# Patient Record
(demographics unavailable — no encounter records)

---

## 2017-11-17 NOTE — NUR
PT RESTLESS, FIGHTING VENT; RASS=0; ER MD SECHRIST BY BEDSIDE; INCREADSED 
propofol TO 20 MCG/KG/MIN

## 2017-11-17 NOTE — NUR
PT CLEANED AND REPOSITIONED. PT SUCTIONED. VS COMPLETE. FAMILY NOTIFIED FROM ED 
LOBBY, FAMILY NOT IN LOBBY, NO RESPONSE UPON CALL.

## 2017-11-17 NOTE — NUR
-------------------------------------------------------------------------------

            *** Note rosalinda in EDM - 11/17/17 at 1940 by TORIBIO ***             

-------------------------------------------------------------------------------

Physician order given to place 2 POINT SOFT restraints to BILATERAL WRIST to 
prevent PULLING/REMOVAL OF TUBES, WIRES AND CATHETERS.  Resraints placed with 
quick-release ties to bed frame.  Pt under observation.

## 2017-11-17 NOTE — NUR
Physician order given to place 2 POINT SOFT restraints to BILATERAL WRIST to 
prevent PULLING/REMOVAL OF TUBES, WIRES AND CATHETERS.  Resraints placed with 
quick-release ties to bed frame.  Pt under observation.

## 2017-11-17 NOTE — NUR
1706- ATIVAN 2 MG IVP GIVEN

1710-IV SALINE LOCK 20 R WRIST

1716- TONIC CLONIC SZ

1717-10MG VECURONIUM IVP GIVEN

1717-30MG ETOMIDATE IVP GIVEN

1718-10MG VECURONIUM IVP GIVEN

1719- ER MD KLEIN 8.0 CM@24CM AT LIP

## 2017-11-17 NOTE — NUR
-------------------------------------------------------------------------------

            *** Note undone in EDM - 11/17/17 at 2018 by DERECK ***             

-------------------------------------------------------------------------------

2 POINTS SOFT RESTRAINED REMOVED; ER MD SECHRDENZEL GAVE VERBAL ORDER TO REMOVE 
RESTRAINTS; RASS=-3; NAD; VSS; WILL CONTINUE TO MONITOR

## 2017-11-17 NOTE — NUR
PT RESTLESSNESS; VENKAT SMITH SECHRIST BY BEDSIDE; INCREADSED propofol TO 10 
MCG/KG/MIN 

-------------------------------------------------------------------------------

Addendum: 11/17/17 at 1925 by DERECK

          *** Amendment undone in EDM - 11/17/17 at 1927 by DERECK Galdamez**          

-------------------------------------------------------------------------------

PT RESTLESS, FIGHTING VENT; RASS=0; VENKAT KLEIN BY BEDSIDE; INCREADSED 
propofol TO 10 MCG/KG/MIN

## 2017-11-17 NOTE — NUR
PT RESTLESS, FIGHTING VENT; RASS=0; ER MD SECHRIST BY BEDSIDE; INCREADSED 
propofol TO 10 MCG/KG/MIN

## 2017-11-17 NOTE — NUR
RASS=-3; VENKAT SMITH VERBAL GAVE ORDER TO TRANSPORT PT TO VENT; PT STABLE FOR 
TRANSPORT; NAD

-------------------------------------------------------------------------------

Addendum: 11/17/17 at 2013 by DERECK

-------------------------------------------------------------------------------

RASS=-3; VENKAT SMITH VERBAL GAVE ORDER TO TRANSPORT PT TO CT; PT STABLE FOR 
TRANSPORT; NAD

## 2017-11-17 NOTE — NUR
PT IN STATUS EPILETICUS. DR. KLEIN, RT, EMT, RN X2 AND XR AT BEDSIDE FOR 
RSI. INTUBATED 8.0 CUFFED AT 24CM AT THE LIP. MEDS GIVEN AS PER MAR.

## 2017-11-17 NOTE — NUR
PT RESTLESS, FIGHTING VENT; RASS=0; ER MD SECHRIST BY BEDSIDE; INCREADSED 
propofol TO 65 MCG/KG/MIN PER ER MD SECHRIST VERBAL ORDER; SD=462/110 PULSE 
LU=974%  HEART RATE=91 RR=30 RASS=0

## 2017-11-17 NOTE — NUR
2 POINTS SOFT RESTRAINED REMOVED; ER MD SECHRIST GAVE VERBAL ORDER TO REMOVE 
RESTRAINTS; RASS=-3; NAD; VSS; WILL CONTINUE TO MONITOR

## 2017-11-17 NOTE — NUR
Pt suddenly bucking the vent and very strong gag response. Increased propofol 
back up to 30 mcg/kg/min.

## 2017-11-17 NOTE — NUR
PT ARRIVED BY BIBA TO [facility]; PT BIBA WITH C/O SZ; ACCORDING TO PARAMEDIC PT 
HAD 17 SZ PTA; PT ACTIVELY SZ-TONIC CLONIC-LASTING 1-2 MINS LONG; ER MD 
SECHRIST BY BEDSIDE

ACCORDING TO PARAMEDIC PT noncompliant WITH depakote; 20G TO LAC STARTED BY 
PARAMEDIC, PATENT BLOOD RETURN, FLUSHED WITH 10 CC NS

## 2017-11-18 NOTE — NUR
[facility] ROOM 230